# Patient Record
Sex: MALE | Employment: FULL TIME | URBAN - METROPOLITAN AREA
[De-identification: names, ages, dates, MRNs, and addresses within clinical notes are randomized per-mention and may not be internally consistent; named-entity substitution may affect disease eponyms.]

---

## 2021-06-03 ENCOUNTER — ANESTHESIA EVENT (OUTPATIENT)
Dept: SURGERY | Age: 51
End: 2021-06-03
Payer: COMMERCIAL

## 2021-06-04 ENCOUNTER — APPOINTMENT (OUTPATIENT)
Dept: GENERAL RADIOLOGY | Age: 51
End: 2021-06-04
Attending: ORTHOPAEDIC SURGERY
Payer: COMMERCIAL

## 2021-06-04 ENCOUNTER — ANESTHESIA (OUTPATIENT)
Dept: SURGERY | Age: 51
End: 2021-06-04
Payer: COMMERCIAL

## 2021-06-04 ENCOUNTER — HOSPITAL ENCOUNTER (OUTPATIENT)
Age: 51
Setting detail: OUTPATIENT SURGERY
Discharge: HOME | End: 2021-06-04
Attending: ORTHOPAEDIC SURGERY | Admitting: ORTHOPAEDIC SURGERY
Payer: COMMERCIAL

## 2021-06-04 VITALS
DIASTOLIC BLOOD PRESSURE: 96 MMHG | OXYGEN SATURATION: 95 % | RESPIRATION RATE: 12 BRPM | HEART RATE: 74 BPM | TEMPERATURE: 97.8 F | SYSTOLIC BLOOD PRESSURE: 127 MMHG

## 2021-06-04 PROCEDURE — 77030010509 HC AIRWY LMA MSK TELE -A: Performed by: ANESTHESIOLOGY

## 2021-06-04 PROCEDURE — 77030010210 HC SPLNT P-CUT SAF BSNM -B: Performed by: ORTHOPAEDIC SURGERY

## 2021-06-04 PROCEDURE — 77030020274 HC MISC IMPL ORTHOPEDIC: Performed by: ORTHOPAEDIC SURGERY

## 2021-06-04 PROCEDURE — 77030040361 HC SLV COMPR DVT MDII -B: Performed by: ORTHOPAEDIC SURGERY

## 2021-06-04 PROCEDURE — C1713 ANCHOR/SCREW BN/BN,TIS/BN: HCPCS | Performed by: ORTHOPAEDIC SURGERY

## 2021-06-04 PROCEDURE — 77030040922 HC BLNKT HYPOTHRM STRY -A

## 2021-06-04 PROCEDURE — 74011250637 HC RX REV CODE- 250/637: Performed by: ANESTHESIOLOGY

## 2021-06-04 PROCEDURE — 74011250636 HC RX REV CODE- 250/636: Performed by: ANESTHESIOLOGY

## 2021-06-04 PROCEDURE — 77030003601 HC NDL NRV BLK BBMI -A

## 2021-06-04 PROCEDURE — 74011000258 HC RX REV CODE- 258: Performed by: ANESTHESIOLOGY

## 2021-06-04 PROCEDURE — 76210000016 HC OR PH I REC 1 TO 1.5 HR: Performed by: ORTHOPAEDIC SURGERY

## 2021-06-04 PROCEDURE — 76010000153 HC OR TIME 1.5 TO 2 HR: Performed by: ORTHOPAEDIC SURGERY

## 2021-06-04 PROCEDURE — 77030042509 HC BIT DRL -C: Performed by: ORTHOPAEDIC SURGERY

## 2021-06-04 PROCEDURE — 73600 X-RAY EXAM OF ANKLE: CPT

## 2021-06-04 PROCEDURE — 77030002933 HC SUT MCRYL J&J -A: Performed by: ORTHOPAEDIC SURGERY

## 2021-06-04 PROCEDURE — 76060000034 HC ANESTHESIA 1.5 TO 2 HR: Performed by: ORTHOPAEDIC SURGERY

## 2021-06-04 PROCEDURE — C1769 GUIDE WIRE: HCPCS | Performed by: ORTHOPAEDIC SURGERY

## 2021-06-04 PROCEDURE — 77030002916 HC SUT ETHLN J&J -A: Performed by: ORTHOPAEDIC SURGERY

## 2021-06-04 PROCEDURE — 77030031139 HC SUT VCRL2 J&J -A: Performed by: ORTHOPAEDIC SURGERY

## 2021-06-04 PROCEDURE — 77030003805 HC BIT DRL EXAC -B: Performed by: ORTHOPAEDIC SURGERY

## 2021-06-04 PROCEDURE — 74011000250 HC RX REV CODE- 250: Performed by: ANESTHESIOLOGY

## 2021-06-04 PROCEDURE — 2709999900 HC NON-CHARGEABLE SUPPLY: Performed by: ORTHOPAEDIC SURGERY

## 2021-06-04 DEVICE — IMPLANTABLE DEVICE: Type: IMPLANTABLE DEVICE | Site: ANKLE | Status: FUNCTIONAL

## 2021-06-04 DEVICE — SYSTEM IMPL TI UHMWPE KNOTLESS FOR SYNDESMOSIS FIX: Type: IMPLANTABLE DEVICE | Site: ANKLE | Status: FUNCTIONAL

## 2021-06-04 DEVICE — SCREW BNE NLCK 3.5X12 MM EPIC: Type: IMPLANTABLE DEVICE | Site: ANKLE | Status: FUNCTIONAL

## 2021-06-04 DEVICE — SCREW BNE LCK 3.5X12 MM EPIC: Type: IMPLANTABLE DEVICE | Site: ANKLE | Status: FUNCTIONAL

## 2021-06-04 RX ORDER — ROPIVACAINE HYDROCHLORIDE 5 MG/ML
INJECTION, SOLUTION EPIDURAL; INFILTRATION; PERINEURAL
Status: COMPLETED | OUTPATIENT
Start: 2021-06-04 | End: 2021-06-04

## 2021-06-04 RX ORDER — ROPIVACAINE HYDROCHLORIDE 5 MG/ML
30 INJECTION, SOLUTION EPIDURAL; INFILTRATION; PERINEURAL AS NEEDED
Status: DISCONTINUED | OUTPATIENT
Start: 2021-06-04 | End: 2021-06-04 | Stop reason: HOSPADM

## 2021-06-04 RX ORDER — FENTANYL CITRATE 50 UG/ML
50 INJECTION, SOLUTION INTRAMUSCULAR; INTRAVENOUS AS NEEDED
Status: DISCONTINUED | OUTPATIENT
Start: 2021-06-04 | End: 2021-06-04 | Stop reason: HOSPADM

## 2021-06-04 RX ORDER — LAMOTRIGINE 100 MG/1
100 TABLET ORAL 2 TIMES DAILY
COMMUNITY

## 2021-06-04 RX ORDER — MIDAZOLAM HYDROCHLORIDE 1 MG/ML
1 INJECTION, SOLUTION INTRAMUSCULAR; INTRAVENOUS AS NEEDED
Status: DISCONTINUED | OUTPATIENT
Start: 2021-06-04 | End: 2021-06-04 | Stop reason: HOSPADM

## 2021-06-04 RX ORDER — ONDANSETRON 2 MG/ML
4 INJECTION INTRAMUSCULAR; INTRAVENOUS AS NEEDED
Status: DISCONTINUED | OUTPATIENT
Start: 2021-06-04 | End: 2021-06-04 | Stop reason: HOSPADM

## 2021-06-04 RX ORDER — SODIUM CHLORIDE 9 MG/ML
25 INJECTION, SOLUTION INTRAVENOUS CONTINUOUS
Status: DISCONTINUED | OUTPATIENT
Start: 2021-06-04 | End: 2021-06-04 | Stop reason: HOSPADM

## 2021-06-04 RX ORDER — HYDROMORPHONE HYDROCHLORIDE 1 MG/ML
0.2 INJECTION, SOLUTION INTRAMUSCULAR; INTRAVENOUS; SUBCUTANEOUS
Status: DISCONTINUED | OUTPATIENT
Start: 2021-06-04 | End: 2021-06-04 | Stop reason: HOSPADM

## 2021-06-04 RX ORDER — LIDOCAINE HYDROCHLORIDE 10 MG/ML
0.1 INJECTION, SOLUTION EPIDURAL; INFILTRATION; INTRACAUDAL; PERINEURAL AS NEEDED
Status: DISCONTINUED | OUTPATIENT
Start: 2021-06-04 | End: 2021-06-04 | Stop reason: HOSPADM

## 2021-06-04 RX ORDER — DIPHENHYDRAMINE HYDROCHLORIDE 50 MG/ML
12.5 INJECTION, SOLUTION INTRAMUSCULAR; INTRAVENOUS AS NEEDED
Status: DISCONTINUED | OUTPATIENT
Start: 2021-06-04 | End: 2021-06-04 | Stop reason: HOSPADM

## 2021-06-04 RX ORDER — ALBUTEROL SULFATE 0.83 MG/ML
2.5 SOLUTION RESPIRATORY (INHALATION) AS NEEDED
Status: DISCONTINUED | OUTPATIENT
Start: 2021-06-04 | End: 2021-06-04 | Stop reason: HOSPADM

## 2021-06-04 RX ORDER — SODIUM CHLORIDE, SODIUM LACTATE, POTASSIUM CHLORIDE, CALCIUM CHLORIDE 600; 310; 30; 20 MG/100ML; MG/100ML; MG/100ML; MG/100ML
1000 INJECTION, SOLUTION INTRAVENOUS CONTINUOUS
Status: DISCONTINUED | OUTPATIENT
Start: 2021-06-04 | End: 2021-06-04 | Stop reason: HOSPADM

## 2021-06-04 RX ORDER — ACETAMINOPHEN 325 MG/1
650 TABLET ORAL ONCE
Status: COMPLETED | OUTPATIENT
Start: 2021-06-04 | End: 2021-06-04

## 2021-06-04 RX ORDER — SODIUM CHLORIDE, SODIUM LACTATE, POTASSIUM CHLORIDE, CALCIUM CHLORIDE 600; 310; 30; 20 MG/100ML; MG/100ML; MG/100ML; MG/100ML
INJECTION, SOLUTION INTRAVENOUS
Status: DISCONTINUED | OUTPATIENT
Start: 2021-06-04 | End: 2021-06-04 | Stop reason: HOSPADM

## 2021-06-04 RX ORDER — MORPHINE SULFATE 2 MG/ML
2 INJECTION, SOLUTION INTRAMUSCULAR; INTRAVENOUS
Status: DISCONTINUED | OUTPATIENT
Start: 2021-06-04 | End: 2021-06-04 | Stop reason: HOSPADM

## 2021-06-04 RX ORDER — FENTANYL CITRATE 50 UG/ML
25 INJECTION, SOLUTION INTRAMUSCULAR; INTRAVENOUS
Status: DISCONTINUED | OUTPATIENT
Start: 2021-06-04 | End: 2021-06-04 | Stop reason: HOSPADM

## 2021-06-04 RX ORDER — FENTANYL CITRATE 50 UG/ML
INJECTION, SOLUTION INTRAMUSCULAR; INTRAVENOUS AS NEEDED
Status: DISCONTINUED | OUTPATIENT
Start: 2021-06-04 | End: 2021-06-04 | Stop reason: HOSPADM

## 2021-06-04 RX ORDER — HYDROCODONE BITARTRATE AND ACETAMINOPHEN 5; 325 MG/1; MG/1
1 TABLET ORAL AS NEEDED
Status: DISCONTINUED | OUTPATIENT
Start: 2021-06-04 | End: 2021-06-04 | Stop reason: HOSPADM

## 2021-06-04 RX ORDER — CEFAZOLIN SODIUM 1 G/3ML
INJECTION, POWDER, FOR SOLUTION INTRAMUSCULAR; INTRAVENOUS AS NEEDED
Status: DISCONTINUED | OUTPATIENT
Start: 2021-06-04 | End: 2021-06-04 | Stop reason: HOSPADM

## 2021-06-04 RX ORDER — PROPOFOL 10 MG/ML
INJECTION, EMULSION INTRAVENOUS AS NEEDED
Status: DISCONTINUED | OUTPATIENT
Start: 2021-06-04 | End: 2021-06-04 | Stop reason: HOSPADM

## 2021-06-04 RX ORDER — MIDAZOLAM HYDROCHLORIDE 1 MG/ML
0.5 INJECTION, SOLUTION INTRAMUSCULAR; INTRAVENOUS
Status: DISCONTINUED | OUTPATIENT
Start: 2021-06-04 | End: 2021-06-04 | Stop reason: HOSPADM

## 2021-06-04 RX ORDER — LIDOCAINE HYDROCHLORIDE 20 MG/ML
INJECTION, SOLUTION EPIDURAL; INFILTRATION; INTRACAUDAL; PERINEURAL AS NEEDED
Status: DISCONTINUED | OUTPATIENT
Start: 2021-06-04 | End: 2021-06-04 | Stop reason: HOSPADM

## 2021-06-04 RX ORDER — GLYCOPYRROLATE 0.2 MG/ML
0.2 INJECTION INTRAMUSCULAR; INTRAVENOUS
Status: DISCONTINUED | OUTPATIENT
Start: 2021-06-04 | End: 2021-06-04 | Stop reason: HOSPADM

## 2021-06-04 RX ORDER — ONDANSETRON 2 MG/ML
INJECTION INTRAMUSCULAR; INTRAVENOUS AS NEEDED
Status: DISCONTINUED | OUTPATIENT
Start: 2021-06-04 | End: 2021-06-04 | Stop reason: HOSPADM

## 2021-06-04 RX ORDER — DEXAMETHASONE SODIUM PHOSPHATE 4 MG/ML
INJECTION, SOLUTION INTRA-ARTICULAR; INTRALESIONAL; INTRAMUSCULAR; INTRAVENOUS; SOFT TISSUE AS NEEDED
Status: DISCONTINUED | OUTPATIENT
Start: 2021-06-04 | End: 2021-06-04 | Stop reason: HOSPADM

## 2021-06-04 RX ADMIN — ROPIVACAINE HYDROCHLORIDE 20 ML: 5 INJECTION, SOLUTION EPIDURAL; INFILTRATION; PERINEURAL at 09:53

## 2021-06-04 RX ADMIN — ACETAMINOPHEN 650 MG: 325 TABLET ORAL at 09:17

## 2021-06-04 RX ADMIN — FENTANYL CITRATE 50 MCG: 50 INJECTION, SOLUTION INTRAMUSCULAR; INTRAVENOUS at 09:45

## 2021-06-04 RX ADMIN — SODIUM CHLORIDE, POTASSIUM CHLORIDE, SODIUM LACTATE AND CALCIUM CHLORIDE 1000 ML: 600; 310; 30; 20 INJECTION, SOLUTION INTRAVENOUS at 09:25

## 2021-06-04 RX ADMIN — ONDANSETRON HYDROCHLORIDE 4 MG: 2 INJECTION, SOLUTION INTRAMUSCULAR; INTRAVENOUS at 10:34

## 2021-06-04 RX ADMIN — FENTANYL CITRATE 50 MCG: 50 INJECTION, SOLUTION INTRAMUSCULAR; INTRAVENOUS at 10:16

## 2021-06-04 RX ADMIN — CEFAZOLIN 2 G: 330 INJECTION, POWDER, FOR SOLUTION INTRAMUSCULAR; INTRAVENOUS at 10:25

## 2021-06-04 RX ADMIN — DEXMEDETOMIDINE HYDROCHLORIDE 8 MCG: 100 INJECTION, SOLUTION, CONCENTRATE INTRAVENOUS at 11:14

## 2021-06-04 RX ADMIN — MIDAZOLAM HYDROCHLORIDE 2 MG: 1 INJECTION, SOLUTION INTRAMUSCULAR; INTRAVENOUS at 09:45

## 2021-06-04 RX ADMIN — PROPOFOL 200 MG: 10 INJECTION, EMULSION INTRAVENOUS at 10:14

## 2021-06-04 RX ADMIN — LIDOCAINE HYDROCHLORIDE 100 MG: 20 INJECTION, SOLUTION EPIDURAL; INFILTRATION; INTRACAUDAL; PERINEURAL at 10:13

## 2021-06-04 RX ADMIN — PROPOFOL 30 MG: 10 INJECTION, EMULSION INTRAVENOUS at 11:42

## 2021-06-04 RX ADMIN — SODIUM CHLORIDE, POTASSIUM CHLORIDE, SODIUM LACTATE AND CALCIUM CHLORIDE: 600; 310; 30; 20 INJECTION, SOLUTION INTRAVENOUS at 10:25

## 2021-06-04 RX ADMIN — PROPOFOL 150 MG: 10 INJECTION, EMULSION INTRAVENOUS at 10:20

## 2021-06-04 RX ADMIN — ROPIVACAINE HYDROCHLORIDE 10 ML: 5 INJECTION, SOLUTION EPIDURAL; INFILTRATION; PERINEURAL at 10:01

## 2021-06-04 RX ADMIN — DEXAMETHASONE SODIUM PHOSPHATE 4 MG: 4 INJECTION, SOLUTION INTRAMUSCULAR; INTRAVENOUS at 10:34

## 2021-06-04 NOTE — ANESTHESIA PROCEDURE NOTES
Peripheral Block    Start time: 6/4/2021 9:55 AM  End time: 6/4/2021 10:01 AM  Performed by: Lelo Manriquez MD  Authorized by: Lelo Manriquez MD       Pre-procedure: Indications: at surgeon's request and post-op pain management    Preanesthetic Checklist: patient identified, risks and benefits discussed, site marked, timeout performed, anesthesia consent given and patient being monitored    Timeout Time: 09:45 EDT          Block Type:   Block Type:   Adductor canal block  Laterality:  Right  Monitoring:  Standard ASA monitoring, continuous pulse ox, frequent vital sign checks, heart rate, oxygen and responsive to questions  Injection Technique:  Single shot  Procedures: ultrasound guided    Patient Position: supine  Prep: chlorhexidine    Location:  Mid thigh  Needle Type:  Stimuplex  Needle Gauge:  22 G  Needle Localization:  Ultrasound guidance  Medication Injected:  Ropivacaine (PF) (NAROPIN)(0.5%) 5 mg/mL injection, 10 mL  Med Admin Time: 6/4/2021 10:01 AM    Assessment:  Number of attempts:  1  Injection Assessment:  Incremental injection every 5 mL, negative aspiration for CSF, no paresthesia, no intravascular symptoms, negative aspiration for blood and local visualized surrounding nerve on ultrasound  Patient tolerance:  Patient tolerated the procedure well with no immediate complications

## 2021-06-04 NOTE — ANESTHESIA POSTPROCEDURE EVALUATION
Post-Anesthesia Evaluation and Assessment    Patient: Jose Pacheco MRN: [id number]  SSN: [id number]    YOB: 1970  Age: 44 y.o. Sex: male      I have evaluated the patient and they are stable and ready for discharge from the PACU. Cardiovascular Function/Vital Signs  Visit Vitals  BP (!) 127/96   Pulse 74   Temp 36.6 °C (97.8 °F)   Resp 12   SpO2 95%       Patient is status post General anesthesia for Procedure(s):  RIGHT ANKLE LATERAL MALLEOLAR FRACTURE OPEN REDUCTION INTERNAL FIXATION; POSSIBLE SYNDESMOSIS FIXATION (URGENT). Nausea/Vomiting: None    Postoperative hydration reviewed and adequate. Pain:  Pain Scale 1: Numeric (0 - 10) (06/04/21 1230)  Pain Intensity 1: 0 (06/04/21 1230)   Managed    Neurological Status:   Neuro (WDL): Exceptions to WDL (06/04/21 5332)  Neuro  Neurologic State: Alert (06/04/21 1230)  LUE Motor Response: Purposeful (06/04/21 1230)  LLE Motor Response: Purposeful (06/04/21 1230)  RUE Motor Response: Purposeful (06/04/21 1230)  RLE Motor Response: Purposeful (06/04/21 1230)   At baseline    Mental Status, Level of Consciousness: Alert and  oriented to person, place, and time    Pulmonary Status:   O2 Device: None (Room air) (06/04/21 1230)   Adequate oxygenation and airway patent    Complications related to anesthesia: None    Post-anesthesia assessment completed. No concerns    Signed By: Cameron Hopper MD     June 4, 2021              Procedure(s):  RIGHT ANKLE LATERAL MALLEOLAR FRACTURE OPEN REDUCTION INTERNAL FIXATION; POSSIBLE SYNDESMOSIS FIXATION (URGENT). general, regional    <BSHSIANPOST>    INITIAL Post-op Vital signs:   Vitals Value Taken Time   /96 06/04/21 1245   Temp 36.6 °C (97.8 °F) 06/04/21 1154   Pulse 78 06/04/21 1257   Resp 19 06/04/21 1257   SpO2 95 % 06/04/21 1257   Vitals shown include unvalidated device data.

## 2021-06-04 NOTE — ANESTHESIA PROCEDURE NOTES
Peripheral Block    Start time: 6/4/2021 9:45 AM  End time: 6/4/2021 9:53 AM  Performed by: Mp Jolly MD  Authorized by: Mp Jolly MD       Pre-procedure:    Indications: at surgeon's request, post-op pain management and procedure for pain    Preanesthetic Checklist: patient identified, risks and benefits discussed, site marked, timeout performed, anesthesia consent given and patient being monitored    Timeout Time: 09:45 EDT          Block Type:   Block Type:  Popliteal  Laterality:  Right  Monitoring:  Standard ASA monitoring, continuous pulse ox, frequent vital sign checks, heart rate, responsive to questions and oxygen  Injection Technique:  Single shot  Procedures: ultrasound guided    Patient Position: supine  Prep: chlorhexidine    Location:  Lower thigh  Needle Type:  Stimuplex  Needle Gauge:  22 G  Needle Localization:  Ultrasound guidance  Medication Injected:  Ropivacaine (PF) (NAROPIN)(0.5%) 5 mg/mL injection, 20 mL  Med Admin Time: 6/4/2021 9:53 AM    Assessment:  Number of attempts:  1  Injection Assessment:  Incremental injection every 5 mL, negative aspiration for CSF, no paresthesia, negative aspiration for blood, no intravascular symptoms and local visualized surrounding nerve on ultrasound  Patient tolerance:  Patient tolerated the procedure well with no immediate complications

## 2021-06-04 NOTE — DISCHARGE INSTRUCTIONS
See discharge sheet in chart. All scripts sent to pharmacy a couple days ago and patient has already. Start keflex/cephelexin tomorrow and complete for 2 days. Oxycodone as needed, can take tylenol with it. NO weightbearing on right lower extremity. Keep splint, clean, dry and intact, do not remove, if any problems, call office to have changed. Call office with any problems at [phone]. Call  Renetta Ready at  for follow up appointment in 15 to 18 days psot op.    ______________________________________________________________________    Anesthesia Discharge Instructions    After general anesthesia or intervenous sedation, for 24 hours or while taking prescription Narcotics:  · Limit your activities  · Do not drive or operate hazardous machinery  · If you have not urinated within 8 hours after discharge, please contact your surgeon on call. · Do not make important personal or business decisions  · Do not drink alcoholic beverages    Report the following to your surgeon:  · Excessive pain, swelling, redness or odor of or around the surgical area  · Temperature over 100.5 degrees  · Nausea and vomiting lasting longer than 4 hours or if unable to take medication  · Any signs of decreased circulation or nerve impairment to extremity:  Change in color, persistent numbness, tingling, coldness or increased pain.   · Any questions

## 2021-06-04 NOTE — OP NOTES
[address]  OPERATIVE REPORT    Name:  Luisa Patel  MR#:  [id number]  :  1970  ACCOUNT #:  [de-identified]  DATE OF SERVICE:  2021      PREOPERATIVE DIAGNOSIS:  Right ankle lateral malleolus closed displaced fracture, initial encounter. POSTOPERATIVE DIAGNOSES:  1. Right ankle lateral malleolus closed displaced fracture, initial encounter. 2.  Right ankle syndesmosis instability. PROCEDURES PERFORMED:  1. Right ankle lateral malleolus open reduction and internal fixation. 2.  Right ankle syndesmosis fixation. 3.  Right lower extremity short leg splint application. 4.  Intraoperative fluoroscopy use and review. SURGEON:  Willie Barbosa MD    ASSISTANT:  None. ANESTHESIA:  General endotracheal and regional pain block. COMPLICATIONS:  None. SPECIMENS REMOVED:  None. IMPLANTS USED:  Arthrex syndesmosis TightRope and the [employer] lateral ankle plate and screws. ESTIMATED BLOOD LOSS:  Less than 10 mL. IV FLUIDS:  500 mL. TOURNIQUET TIME:  60 minutes total at 300 mmHg of thigh tourniquet. DRAINS:  None. DISPOSITION:  Stable. INDICATIONS FOR PROCEDURE:  The patient has a displaced lateral malleolus fracture and surgery recommended for best outcome. There appears to be instability at syndesmosis with widening of medial clear space in addition to the syndesmosis instability. The patient well informed about surgical plan, risks, potential complications, expected outcomes, postoperative limitations, time needed for recovery. There is risk of neurovascular injury, infection, nonunion, malunion, delayed healing in general including incision area. The patient is a smoker of cigarettes, informed that healing can be a problem/delayed and can result in nonunion, recommended smoking cessation while fracture heals. All questions answered, no guarantees are made, informed consent obtained.     PROCEDURE IN DETAIL:  The patient identified in the preoperative holding area. The right lower extremity is marked as surgical site. Anesthesia administered pain block to the right lower extremity and he was brought to the operating room, placed supine, administered general anesthesia. Once achieved, a well-padded tourniquet applied around the thigh. Prepping and draping completed in the usual fashion and time-out is called and documented. The patient is administered IV antibiotics prior to incision. The leg elevated, exsanguinated with an Esmarch, tourniquet inflated. Lateral incision to the fibula made in the usual fashion, fracture identified and neurovascular structures and the tendon are protected during this procedure, fracture preparations  made in the usual fashion, fibula/lateral malleolus fracture is reduced and temporary fixation with K-wire and clamps. Plate for fixation of fibula is selected and after insertion of a lag screw across fracture from posterior to anterior which compressed the fracture ends, then the plate fixation is completed. There is good reduction of the fracture at this point. The reduction clamp is  removed to apply the lateral plate. The nonlocking screws are inserted first distal and proximal and once the plate is well conformed to the reduced lateral malleolus, a few distal locking screws inserted to avoid penetration of the distal joint space between the fibula and talus. The plate once applied confirmed in excellent position by fluoroscopy. Next, tested the syndesmosis which shows some instability and due to this instability at the syndesmosis, proceeded to fixation with a TightRope syndesmosis implant. Usual preparations made for TightRope by using a drill, once the drill inserted across the four cortices, the TightRope is inserted and locked on the medial side, a small incision had been made for the clamp to hold the syndesmosis intact in reduced position prior to insertion of the implant.   Once the implant seated mediallly, the sutures are tightened and tied down on the lateral side through plate and remaining sutures are cut. There is excellent stable fixation. The large clamp used to reduce the syndesmosis is removed. Next, obtained final fluoroscopy views, which are saved. The 3 views obtained confirmed good position of the implants, reduced ankle mortise and fracture. The wound/inicision is copiously irrigated and closure achieved in layers closing the deep with 2-0 Vicryl, subcutaneously with 3-0 Vicryl and nylon for the skin. On the medial side, the small incision made for the clamp is repaired with nylon. The tourniquet deflated. No significant bleeders noted, and there is excellent capillary refill to the toes. The leg/ankle is cleaned and dried. Sterile dressings and a well-padded splint are applied. The patient is awakened from anesthesia, found stable, transferred to recovery room in same stable condition. There are no surgical or anesthetic complications during this procedure and all sponge and needle counts are correct at the end of procedure.       MD DILAN [email or web address]  D:  06/04/2021 11:57  T:  06/04/2021 14:51  JOB #:  [id number]

## 2021-06-04 NOTE — BRIEF OP NOTE
Brief Postoperative Note    Patient: Brittany Morales  YOB: 1970  MRN: [id number]    Date of Procedure: 6/4/2021     Pre-Op Diagnosis: RIGHT ANKLE CLOSED DISPLACED MALLEOLAR FRACTURE    Post-Op Diagnosis: RIGHT ANKLE CLOSED DISPLACED LATERAL MALLEOLUS FRACTURE, INITIAL ENCOUNTER. Procedure(s):  RIGHT ANKLE LATERAL MALLEOLUS FRACTURE OPEN REDUCTION INTERNAL FIXATION; SYNDESMOSIS FIXATION (URGENT)    Surgeon(s):  Nicole Cotto MD    Surgical Assistant: Surg Asst-1: Art MACKENZIE    Anesthesia: General     Estimated Blood Loss (mL): Minimal less than 10 cc. Complications: None. Specimens: None. Implants:   Implant Name Type Inv. Item Serial No.  Lot No. LRB No. Used Action   SET INTERNAL FIX IMPL TI --  - SNA  SET INTERNAL FIX IMPL TI --  NA [employer]WD [id number] Right 1 Implanted   SCREW BNE NLCK 2.7X16 MM EPIC - SNA  SCREW BNE NLCK 2.7X16 MM EPIC NA [employer] NA Right 1 Implanted   SCREW BNE NLCK 3.5X12 MM EPIC - SNA  SCREW BNE NLCK 3.5X12 MM EPIC NA [employer] NA Right 3 Implanted   SCREW BNE NLCK 3.5X20 MM EPIC - SNA  SCREW BNE NLCK 3.5X20 MM EPIC NA [employer] NA Right 1 Implanted   Right Fibula Plate Long   NA [employer] NA Right 1 Implanted   SCREW BNE LCK 3.5X12 MM EPIC - SNA  SCREW BNE LCK 3.5X12 MM EPIC NA [employer] NA Right 1 Implanted   SCREW BNE LCK 3.5X14 MM [[id number]] [[employer]] - SNA  SCREW BNE LCK 3.5X14 MM [[id number]] [[employer]] NA [employer] NA Right 1 Implanted       Drains: None. Findings: Lateral malleolus fracture well reduced and syndesmosis fixation needed, some instability at syndesmosis.       Electronically Signed by Mulu Domínguez MD on 6/4/2021 at 11:46 AM

## 2021-06-04 NOTE — ANESTHESIA PREPROCEDURE EVALUATION
Relevant Problems   No relevant active problems       Anesthetic History   No history of anesthetic complications            Review of Systems / Medical History  Patient summary reviewed, nursing notes reviewed and pertinent labs reviewed    Pulmonary  Within defined limits                 Neuro/Psych   Within defined limits           Cardiovascular  Within defined limits                     GI/Hepatic/Renal     GERD: well controlled           Endo/Other  Within defined limits           Other Findings              Physical Exam    Airway  Mallampati: II  TM Distance: > 6 cm  Neck ROM: normal range of motion   Mouth opening: Normal     Cardiovascular  Regular rate and rhythm,  S1 and S2 normal,  no murmur, click, rub, or gallop             Dental  No notable dental hx       Pulmonary  Breath sounds clear to auscultation               Abdominal  GI exam deferred       Other Findings            Anesthetic Plan    ASA: 2  Anesthesia type: general      Post-op pain plan if not by surgeon: peripheral nerve block single    Induction: Intravenous  Anesthetic plan and risks discussed with: Patient

## 2021-11-12 VITALS — WEIGHT: 175 LBS | HEIGHT: 72 IN | BODY MASS INDEX: 23.7 KG/M2

## 2021-11-12 PROBLEM — Z09 SURGERY FOLLOW-UP: Status: ACTIVE | Noted: 2021-11-12

## 2021-11-12 PROBLEM — M25.471 RIGHT ANKLE SWELLING: Status: ACTIVE | Noted: 2021-11-12

## 2021-11-12 PROBLEM — S82.61XD: Status: ACTIVE | Noted: 2021-11-12

## 2021-11-19 ENCOUNTER — OFFICE VISIT (OUTPATIENT)
Dept: ORTHOPEDIC SURGERY | Age: 51
End: 2021-11-19
Payer: COMMERCIAL

## 2021-11-19 VITALS — BODY MASS INDEX: 23.73 KG/M2 | WEIGHT: 175 LBS

## 2021-11-19 DIAGNOSIS — Z98.890 STATUS POST ORIF OF FRACTURE OF ANKLE: ICD-10-CM

## 2021-11-19 DIAGNOSIS — S82.891G CLOSED RIGHT ANKLE FRACTURE, WITH DELAYED HEALING, SUBSEQUENT ENCOUNTER: ICD-10-CM

## 2021-11-19 DIAGNOSIS — M25.471 RIGHT ANKLE SWELLING: Primary | ICD-10-CM

## 2021-11-19 DIAGNOSIS — Z87.81 STATUS POST ORIF OF FRACTURE OF ANKLE: ICD-10-CM

## 2021-11-19 PROCEDURE — 99212 OFFICE O/P EST SF 10 MIN: CPT | Performed by: ORTHOPAEDIC SURGERY

## 2021-11-19 NOTE — PROGRESS NOTES
Chief Complaint   Patient presents with    Follow-up     Right foot     David Pérez (: 1970) is a 46 y.o. male, patient,here for evaluation of the following   Chief Complaint   Patient presents with    Follow-up     Right foot        ASSESSMENT/PLAN:  Below is the assessment and plan developed based on review of pertinent history, physical exam, labs, studies, and medications. 1. Right ankle swelling  -     CT ANKLE RT WO  CONT; Future  2. Status post ORIF of fracture of ankle  -     CT ANKLE RT WO  CONT; Future  3. Closed right ankle fracture, with delayed healing, subsequent encounter  -     CT ANKLE RT WO  CONT; Future      Patient had to return because the CT scan ordered was not completed. CT CAT scan recommended is right lower extremity focusing on the right ankle without contrast, to evaluate for why he continues to have pain status post open reduction internal fixation right ankle lateral malleolus in early part of  has not fully recovered. His x-rays did not show anything obvious that would be resulting in the pain described so had recommended CT scan without contrast last time seen on 2021. No new complaints today. Return for Call patient at [phone] with CT scan results and based on the results will return as needed. .      Allergies   Allergen Reactions    Animal Dander Not Reported This Time    Codeine Nausea Only    Mold Not Reported This Time       Current Outpatient Medications   Medication Sig    lamoTRIgine (LaMICtaL) 100 mg tablet Take 100 mg by mouth two (2) times a day.  famotidine (PEPCID AC PO) Take 1 Tablet by mouth as needed.  esomeprazole magnesium (NEXIUM PO) Take 1 Tablet by mouth as needed. No current facility-administered medications for this visit.        Past Medical History:   Diagnosis Date    Allergies     Cavernous malformation     GERD (gastroesophageal reflux disease)     Hiatal hernia     Seizures ([facility] [address]        No past surgical history on file. No family history on file. Social History     Socioeconomic History    Marital status: SINGLE     Spouse name: Not on file    Number of children: Not on file    Years of education: Not on file    Highest education level: Not on file   Occupational History    Not on file   Tobacco Use    Smoking status: Never Smoker    Smokeless tobacco: Current User   Substance and Sexual Activity    Alcohol use: Yes     Alcohol/week: 14.0 standard drinks     Types: 14 Glasses of wine per week     Comment: bottle of wine at night    Drug use: Never    Sexual activity: Not on file   Other Topics Concern    Not on file   Social History Narrative    Not on file     Social Determinants of Health     Financial Resource Strain:     Difficulty of Paying Living Expenses: Not on file   Food Insecurity:     Worried About Running Out of Food in the Last Year: Not on file    Jackelyn of Food in the Last Year: Not on file   Transportation Needs:     Lack of Transportation (Medical): Not on file    Lack of Transportation (Non-Medical):  Not on file   Physical Activity:     Days of Exercise per Week: Not on file    Minutes of Exercise per Session: Not on file   Stress:     Feeling of Stress : Not on file   Social Connections:     Frequency of Communication with Friends and Family: Not on file    Frequency of Social Gatherings with Friends and Family: Not on file    Attends [employer] Services: Not on file    Active Member of Clubs or Organizations: Not on file    Attends Club or Organization Meetings: Not on file    Marital Status: Not on file   Intimate Partner Violence:     Fear of Current or Ex-Partner: Not on file    Emotionally Abused: Not on file    Physically Abused: Not on file    Sexually Abused: Not on file   Housing Stability:     Unable to Pay for Housing in the Last Year: Not on file    Number of [place] in the Last Year: Not on file    Unstable Housing in the Last Year: Not on file           Vitals: Wt 175 lb (79.4 kg)   BMI 23.73 kg/m²    Body mass index is 23.73 kg/m². ROS     Negative for: Constitutional, Gastrointestinal, Neurological, Skin, Genitourinary, Musculoskeletal (Right ankle pain), HENT, Endocrine, Cardiovascular, Eyes, Respiratory, Psychiatric, Allergic/Imm, Heme/Lymph    Last edited by Leonora Santiago on 2021  2:24 PM. (History)              SUBJECTIVE/OBJECTIVE:  Tracey Amanda (: 1970)   Patient back because he was supposed to be status post CT scan but just not done. He continues to have right ankle pain. He has history of right ankle open reduction internal fixation of displaced fracture and continues to have pain. Physical Exam  Pleasant well-nourished male , alert and oriented to person, time and place, no acute distress. Mild antalgic gait, normal weightbearing stance. Right ankle: No swelling, previous incision well-healed, implants not easily palpable. Ankle joint is grossly stable. Suspect range of motion with dorsiflexion plantarflexion. Right foot: Nontender, no swelling, normal exam.    Contralateral foot and ankle exam, nontender, no swelling ligaments grossly stable. Normal weightbearing stance. Neurovascular exam intact for light touch sensation, cap refill, dorsalis pedis pulse palpable, flexion/extension strength 5/5. Skin intact without open wounds, lesions or ulcers, no skin discolorations, no warmth to skin. Imaging:    No x-rays obtained, we obtained one on 2021 which showed satisfactory alignment on 3 views compared to contralateral ankle. Implants were intact. An electronic signature was used to authenticate this note.   -- Tahir Leon MD

## 2021-11-22 ENCOUNTER — TRANSCRIBE ORDER (OUTPATIENT)
Dept: SCHEDULING | Age: 51
End: 2021-11-22

## 2021-11-22 DIAGNOSIS — Z98.890 STATUS POST ORIF OF FRACTURE OF ANKLE: ICD-10-CM

## 2021-11-22 DIAGNOSIS — Z87.81 STATUS POST ORIF OF FRACTURE OF ANKLE: ICD-10-CM

## 2021-11-22 DIAGNOSIS — M25.471 RIGHT ANKLE SWELLING: Primary | ICD-10-CM

## 2021-11-22 DIAGNOSIS — S82.891G CLOSED RIGHT ANKLE FRACTURE, WITH DELAYED HEALING, SUBSEQUENT ENCOUNTER: ICD-10-CM

## 2021-11-26 ENCOUNTER — HOSPITAL ENCOUNTER (OUTPATIENT)
Dept: CT IMAGING | Age: 51
Discharge: HOME | End: 2021-11-26
Attending: ORTHOPAEDIC SURGERY
Payer: COMMERCIAL

## 2021-11-26 DIAGNOSIS — S82.891G CLOSED RIGHT ANKLE FRACTURE, WITH DELAYED HEALING, SUBSEQUENT ENCOUNTER: ICD-10-CM

## 2021-11-26 DIAGNOSIS — Z98.890 STATUS POST ORIF OF FRACTURE OF ANKLE: ICD-10-CM

## 2021-11-26 DIAGNOSIS — M25.471 RIGHT ANKLE SWELLING: ICD-10-CM

## 2021-11-26 DIAGNOSIS — Z87.81 STATUS POST ORIF OF FRACTURE OF ANKLE: ICD-10-CM

## 2021-11-26 PROCEDURE — 73700 CT LOWER EXTREMITY W/O DYE: CPT

## 2022-03-18 PROBLEM — Z09 SURGERY FOLLOW-UP: Status: ACTIVE | Noted: 2021-11-12

## 2022-03-19 PROBLEM — M25.471 RIGHT ANKLE SWELLING: Status: ACTIVE | Noted: 2021-11-12

## 2022-03-19 PROBLEM — S82.61XD: Status: ACTIVE | Noted: 2021-11-12

## 2023-02-02 ENCOUNTER — APPOINTMENT (OUTPATIENT)
Dept: CT IMAGING | Age: 53
End: 2023-02-02
Attending: EMERGENCY MEDICINE
Payer: COMMERCIAL

## 2023-02-02 ENCOUNTER — HOSPITAL ENCOUNTER (EMERGENCY)
Age: 53
Discharge: HOME | End: 2023-02-02
Attending: EMERGENCY MEDICINE
Payer: COMMERCIAL

## 2023-02-02 VITALS
BODY MASS INDEX: 23.72 KG/M2 | SYSTOLIC BLOOD PRESSURE: 150 MMHG | HEART RATE: 90 BPM | RESPIRATION RATE: 12 BRPM | TEMPERATURE: 97.6 F | OXYGEN SATURATION: 99 % | DIASTOLIC BLOOD PRESSURE: 96 MMHG | WEIGHT: 179 LBS | HEIGHT: 73 IN

## 2023-02-02 DIAGNOSIS — R51.9 NONINTRACTABLE HEADACHE, UNSPECIFIED CHRONICITY PATTERN, UNSPECIFIED HEADACHE TYPE: Primary | ICD-10-CM

## 2023-02-02 PROCEDURE — 70450 CT HEAD/BRAIN W/O DYE: CPT

## 2023-02-02 PROCEDURE — 99284 EMERGENCY DEPT VISIT MOD MDM: CPT

## 2023-02-02 NOTE — ED PROVIDER NOTES
45 yoM hx of cavernous malformation and ICH, presenting to ED for evaluation of R sided headache. HA started 1 week ago. Pain worse with bending over. No meds taken for for pain. +Fatigue, working 80 hrs/week and high stress job. Pt was also doing squats at gym, worried he'd strained something. Neurology appt tomorrow [facility]. Pt denies any neurologic complaints at this time. Past Medical History:   Diagnosis Date    Allergies     Cavernous malformation 2008    GERD (gastroesophageal reflux disease)     Hiatal hernia     Seizures ([facility] [address] 2008       History reviewed. No pertinent surgical history. History reviewed. No pertinent family history. Social History     Socioeconomic History    Marital status: SINGLE     Spouse name: Not on file    Number of children: Not on file    Years of education: Not on file    Highest education level: Not on file   Occupational History    Not on file   Tobacco Use    Smoking status: Never    Smokeless tobacco: Current   Substance and Sexual Activity    Alcohol use: Yes     Alcohol/week: 14.0 standard drinks     Types: 14 Glasses of wine per week     Comment: bottle of wine at night    Drug use: Never    Sexual activity: Not on file   Other Topics Concern    Not on file   Social History Narrative    Not on file     Social Determinants of Health     Financial Resource Strain: Not on file   Food Insecurity: Not on file   Transportation Needs: Not on file   Physical Activity: Not on file   Stress: Not on file   Social Connections: Not on file   Intimate Partner Violence: Not on file   Housing Stability: Not on file         ALLERGIES: Animal dander, Codeine, and Mold    Review of Systems   Constitutional:  Negative for fever. Respiratory:  Negative for shortness of breath. Cardiovascular:  Negative for chest pain. Gastrointestinal:  Negative for abdominal pain. Allergic/Immunologic: Negative for immunocompromised state. Psychiatric/Behavioral:  Negative for confusion. Vitals:    02/02/23 1600   BP: (!) 145/99   Pulse: 89   Resp: 16   Temp: 97.4 °F (36.3 °C)   SpO2: 99%   Weight: 81.2 kg (179 lb)   Height: 6' 1\" (1.854 m)            Physical Exam  Constitutional:       General: He is not in acute distress. Appearance: Normal appearance. HENT:      Head: Normocephalic. Mouth/Throat:      Mouth: Mucous membranes are moist.   Eyes:      General: No scleral icterus. Extraocular Movements: Extraocular movements intact. Pulmonary:      Effort: Pulmonary effort is normal. No respiratory distress. Musculoskeletal:         General: No deformity. Skin:     General: Skin is warm and dry. Coloration: Skin is not jaundiced or pale. Neurological:      General: No focal deficit present. Mental Status: He is alert and oriented to person, place, and time. Mental status is at baseline. Psychiatric:         Mood and Affect: Mood normal.         Behavior: Behavior normal.        Medical Decision Making  49-year-old male history of cavernous malformation, presenting referred by neurologist for CT head given intermittent head discomfort. He has no neurologic deficits. He is declining any pain medication at this time. A CT of the head was ordered prior to my evaluation showing no acute process at this time. In discussion with the patient, he is requesting discharge as he feels well and feels reassured about his negative CT finding. Orders Placed This Encounter      CT HEAD WO CONT        Amount and/or Complexity of Data Reviewed  Radiology: ordered. Decision-making details documented in ED Course.       ED Course as of 02/02/23 1821   Thu Feb 02, 2023 1810 CT HEAD WO CONT  No acute process [SL]      ED Course User Index  [SL] Tommy Blanco MD       Procedures

## 2023-02-02 NOTE — ED TRIAGE NOTES
Pt arrives with R sided headache x 1 week and hx of cavernoma and hx of bleeds in the past. Pt's neurologist told pt to come to ED to have CT scan. VSS.

## (undated) DEVICE — BIT DRL 2.7 MM SLD

## (undated) DEVICE — SUTURE VCRL SZ 0 L27IN ABSRB UD L36MM CT-1 1/2 CIR J260H

## (undated) DEVICE — SPONGE GZ W4XL4IN COT 12 PLY TYP VII WVN C FLD DSGN

## (undated) DEVICE — BANDAGE,ELASTIC,ESMARK,STERILE,4"X9',LF: Brand: MEDLINE

## (undated) DEVICE — BIT DRL 2 MM SLD

## (undated) DEVICE — PADDING CST 4INX4YD --

## (undated) DEVICE — GARMENT,MEDLINE,DVT,INT,CALF,MED, GEN2: Brand: MEDLINE

## (undated) DEVICE — SUTURE VCRL SZ 2-0 L27IN ABSRB UD L26MM SH 1/2 CIR J417H

## (undated) DEVICE — SUTURE MCRYL SZ 3-0 L27IN ABSRB UD L19MM PS-2 3/8 CIR PRIM Y427H

## (undated) DEVICE — GOWN,SIRUS,NONRNF,SETINSLV,2XL,18/CS: Brand: MEDLINE

## (undated) DEVICE — DRAPE,REIN 53X77,STERILE: Brand: MEDLINE

## (undated) DEVICE — BANDAGE COBAN 4 IN COMPR W4INXL5YD FOAM COHESIVE QUIK STK SELF ADH SFT

## (undated) DEVICE — STERILE POLYISOPRENE POWDER-FREE SURGICAL GLOVES: Brand: PROTEXIS

## (undated) DEVICE — PENCIL ES L3M BTTN SWCH S STL HEX LOK BLDE ELECTRD HOLSTER

## (undated) DEVICE — SURGICAL PROCEDURE PACK BASIN MAJ SET CUST NO CAUT

## (undated) DEVICE — DRESSING PETRO W3XL8IN N ADH OIL EMUL GZ CURAD

## (undated) DEVICE — SYR 10ML LUER LOK 1/5ML GRAD --

## (undated) DEVICE — SOL IRR SOD CL 0.9% 1000ML BTL --

## (undated) DEVICE — PREP SKN CHLRAPRP APL 26ML STR --

## (undated) DEVICE — PIN FIX OLV 1.6 MM

## (undated) DEVICE — DRAPE C-ARMOUR C-ARM KIT --

## (undated) DEVICE — BANDAGE COMPR M W6INXL10YD WHT BGE VELC E MTRX HK AND LOOP

## (undated) DEVICE — INFECTION CONTROL KIT SYS

## (undated) DEVICE — PACK,BASIC,SIRUS,V: Brand: MEDLINE

## (undated) DEVICE — DRAPE,U/ SHT,SPLIT,PLAS,STERIL: Brand: MEDLINE

## (undated) DEVICE — PADDING CST 6IN STERILE --

## (undated) DEVICE — TUBING SUCT 10FR MAL ALUM SHFT FN CAP VENT UNIV CONN W/ OBT

## (undated) DEVICE — STERILE POLYISOPRENE POWDER-FREE SURGICAL GLOVES WITH EMOLLIENT COATING: Brand: PROTEXIS

## (undated) DEVICE — SPLINT THMB W5XL30IN FBRGLS PD PRECUT LTWT DURABLE FAST SET

## (undated) DEVICE — DRAPE,EXTREMITY,89X128,STERILE: Brand: MEDLINE

## (undated) DEVICE — INTENDED FOR TISSUE SEPARATION, AND OTHER PROCEDURES THAT REQUIRE A SHARP SURGICAL BLADE TO PUNCTURE OR CUT.: Brand: BARD-PARKER ® CARBON RIB-BACK BLADES

## (undated) DEVICE — REM POLYHESIVE ADULT PATIENT RETURN ELECTRODE: Brand: VALLEYLAB

## (undated) DEVICE — PAD,ABDOMINAL,5"X9",ST,LF,25/BX: Brand: MEDLINE INDUSTRIES, INC.

## (undated) DEVICE — SUT ETHLN 3-0 18IN PS1 BLK --